# Patient Record
Sex: MALE | Race: BLACK OR AFRICAN AMERICAN | NOT HISPANIC OR LATINO | URBAN - METROPOLITAN AREA
[De-identification: names, ages, dates, MRNs, and addresses within clinical notes are randomized per-mention and may not be internally consistent; named-entity substitution may affect disease eponyms.]

---

## 2017-02-14 ENCOUNTER — COMMUNICATION - RIVER FALLS (OUTPATIENT)
Dept: FAMILY MEDICINE | Facility: CLINIC | Age: 22
End: 2017-02-14

## 2017-02-14 ENCOUNTER — OFFICE VISIT - RIVER FALLS (OUTPATIENT)
Dept: FAMILY MEDICINE | Facility: CLINIC | Age: 22
End: 2017-02-14

## 2017-02-14 ASSESSMENT — MIFFLIN-ST. JEOR: SCORE: 1857.62

## 2017-03-21 ENCOUNTER — OFFICE VISIT - RIVER FALLS (OUTPATIENT)
Dept: FAMILY MEDICINE | Facility: CLINIC | Age: 22
End: 2017-03-21

## 2017-03-21 ASSESSMENT — MIFFLIN-ST. JEOR: SCORE: 1880.3

## 2017-04-21 ENCOUNTER — OFFICE VISIT - RIVER FALLS (OUTPATIENT)
Dept: FAMILY MEDICINE | Facility: CLINIC | Age: 22
End: 2017-04-21

## 2017-04-21 ASSESSMENT — MIFFLIN-ST. JEOR: SCORE: 1893.91

## 2017-05-30 ENCOUNTER — OFFICE VISIT - RIVER FALLS (OUTPATIENT)
Dept: FAMILY MEDICINE | Facility: CLINIC | Age: 22
End: 2017-05-30

## 2017-05-30 ASSESSMENT — MIFFLIN-ST. JEOR: SCORE: 1907.51

## 2017-08-18 ENCOUNTER — OFFICE VISIT - RIVER FALLS (OUTPATIENT)
Dept: FAMILY MEDICINE | Facility: CLINIC | Age: 22
End: 2017-08-18

## 2017-08-18 ASSESSMENT — MIFFLIN-ST. JEOR: SCORE: 1912.05

## 2017-09-15 ENCOUNTER — AMBULATORY - RIVER FALLS (OUTPATIENT)
Dept: FAMILY MEDICINE | Facility: CLINIC | Age: 22
End: 2017-09-15

## 2017-09-20 ENCOUNTER — OFFICE VISIT - RIVER FALLS (OUTPATIENT)
Dept: FAMILY MEDICINE | Facility: CLINIC | Age: 22
End: 2017-09-20

## 2017-09-20 ASSESSMENT — MIFFLIN-ST. JEOR: SCORE: 1925.66

## 2018-09-10 ENCOUNTER — OFFICE VISIT - RIVER FALLS (OUTPATIENT)
Dept: FAMILY MEDICINE | Facility: CLINIC | Age: 23
End: 2018-09-10

## 2018-09-10 ENCOUNTER — COMMUNICATION - RIVER FALLS (OUTPATIENT)
Dept: FAMILY MEDICINE | Facility: CLINIC | Age: 23
End: 2018-09-10

## 2018-09-10 ASSESSMENT — MIFFLIN-ST. JEOR: SCORE: 1901.16

## 2019-11-11 ENCOUNTER — OFFICE VISIT - RIVER FALLS (OUTPATIENT)
Dept: FAMILY MEDICINE | Facility: CLINIC | Age: 24
End: 2019-11-11

## 2020-03-02 ENCOUNTER — COMMUNICATION - RIVER FALLS (OUTPATIENT)
Dept: FAMILY MEDICINE | Facility: CLINIC | Age: 25
End: 2020-03-02

## 2020-03-02 ENCOUNTER — OFFICE VISIT - RIVER FALLS (OUTPATIENT)
Dept: FAMILY MEDICINE | Facility: CLINIC | Age: 25
End: 2020-03-02

## 2020-08-31 ENCOUNTER — COMMUNICATION - RIVER FALLS (OUTPATIENT)
Dept: FAMILY MEDICINE | Facility: CLINIC | Age: 25
End: 2020-08-31

## 2020-09-01 ENCOUNTER — OFFICE VISIT - RIVER FALLS (OUTPATIENT)
Dept: FAMILY MEDICINE | Facility: CLINIC | Age: 25
End: 2020-09-01

## 2020-10-09 ENCOUNTER — OFFICE VISIT - RIVER FALLS (OUTPATIENT)
Dept: FAMILY MEDICINE | Facility: CLINIC | Age: 25
End: 2020-10-09

## 2020-12-14 ENCOUNTER — COMMUNICATION - RIVER FALLS (OUTPATIENT)
Dept: FAMILY MEDICINE | Facility: CLINIC | Age: 25
End: 2020-12-14

## 2020-12-15 ENCOUNTER — COMMUNICATION - RIVER FALLS (OUTPATIENT)
Dept: FAMILY MEDICINE | Facility: CLINIC | Age: 25
End: 2020-12-15

## 2022-02-12 VITALS
BODY MASS INDEX: 27.12 KG/M2 | SYSTOLIC BLOOD PRESSURE: 134 MMHG | HEART RATE: 105 BPM | TEMPERATURE: 98.6 F | OXYGEN SATURATION: 99 % | DIASTOLIC BLOOD PRESSURE: 74 MMHG | WEIGHT: 200 LBS

## 2022-02-12 VITALS
BODY MASS INDEX: 25.6 KG/M2 | TEMPERATURE: 97.3 F | SYSTOLIC BLOOD PRESSURE: 129 MMHG | TEMPERATURE: 98.8 F | HEIGHT: 72 IN | HEIGHT: 72 IN | BODY MASS INDEX: 26.41 KG/M2 | BODY MASS INDEX: 26.01 KG/M2 | WEIGHT: 195 LBS | SYSTOLIC BLOOD PRESSURE: 126 MMHG | HEART RATE: 92 BPM | HEART RATE: 86 BPM | WEIGHT: 189 LBS | HEIGHT: 72 IN | SYSTOLIC BLOOD PRESSURE: 120 MMHG | DIASTOLIC BLOOD PRESSURE: 68 MMHG | HEART RATE: 79 BPM | DIASTOLIC BLOOD PRESSURE: 74 MMHG | DIASTOLIC BLOOD PRESSURE: 80 MMHG | TEMPERATURE: 98.3 F | WEIGHT: 192 LBS

## 2022-02-12 VITALS
HEART RATE: 101 BPM | OXYGEN SATURATION: 98 % | SYSTOLIC BLOOD PRESSURE: 122 MMHG | BODY MASS INDEX: 26.95 KG/M2 | WEIGHT: 199 LBS | HEIGHT: 72 IN | DIASTOLIC BLOOD PRESSURE: 74 MMHG

## 2022-02-12 VITALS
WEIGHT: 190.2 LBS | DIASTOLIC BLOOD PRESSURE: 72 MMHG | HEART RATE: 102 BPM | TEMPERATURE: 98.5 F | SYSTOLIC BLOOD PRESSURE: 130 MMHG | OXYGEN SATURATION: 98 % | BODY MASS INDEX: 25.8 KG/M2

## 2022-02-12 VITALS
HEART RATE: 72 BPM | HEIGHT: 72 IN | TEMPERATURE: 98.1 F | BODY MASS INDEX: 26.22 KG/M2 | DIASTOLIC BLOOD PRESSURE: 80 MMHG | SYSTOLIC BLOOD PRESSURE: 132 MMHG | WEIGHT: 193.6 LBS

## 2022-02-12 VITALS
SYSTOLIC BLOOD PRESSURE: 114 MMHG | DIASTOLIC BLOOD PRESSURE: 78 MMHG | BODY MASS INDEX: 26.55 KG/M2 | WEIGHT: 196 LBS | HEART RATE: 84 BPM | TEMPERATURE: 97.8 F | HEIGHT: 72 IN

## 2022-02-12 VITALS
DIASTOLIC BLOOD PRESSURE: 68 MMHG | HEART RATE: 80 BPM | TEMPERATURE: 98.5 F | BODY MASS INDEX: 24.92 KG/M2 | HEIGHT: 72 IN | WEIGHT: 184 LBS | SYSTOLIC BLOOD PRESSURE: 120 MMHG

## 2022-02-16 NOTE — LETTER
(Inserted Image. Unable to display)   December 11, 2019        REGINALD MENESES  [address]   [place], [place] [id number]        Dear REGINALD,      Thank you for selecting [facility] for your healthcare needs.    Our records indicate you are due for the following services:     Medication Check    To schedule an appointment or if you have further questions, please contact your primary clinic:   [facility]       [phone]   [facility]       [phone]              [facility]     [phone]      Powered by [employer]    Sincerely,    PARISH JeanNP

## 2022-02-16 NOTE — TELEPHONE ENCOUNTER
Entered by Celina Miller CMA on March 02, 2020 9:51:39 AM CST  Has appt with GAIL this evening.      ---------------------  From: REGINALD MENESES  To: [facility]  Sent: 02/28/2020 06:37 p.m. CST  Subject: Prescription renewal request  REGINALD MENESES is requesting renewal of the prescription(s) below and has submitted the following details:  Prescription(s) to be renewed  Medication: Effexor XR 37.5 mg oral capsule, extended release  Dose: See Instructions  Rx date: 11/11/2019  Prescribed by: Neris Youssef  Reason for renewal: Out of medication  Quantity: 5 days worth  Delivery option  Send to my pharmacy  [facility]  [address] [place] [id number]  [place] [address]  Contact Information  By Secure Message  Comments  Is there anyway that I could receive a short-term prescription to this medication until the day of my med-check appointment? Thank you.

## 2022-02-16 NOTE — NURSING NOTE
Comprehensive Intake Entered On:  11/11/2019 4:30 PM CST    Performed On:  11/11/2019 4:24 PM CST by Mary Kay Hayes LPN               Summary   Chief Complaint :   would like to discuss starting back up on antidepressant medication, says he has been off of medication for about 1 year   Weight Measured :   190.2 lb(Converted to: 190 lb 3 oz, 86.27 kg)    Systolic Blood Pressure :   130 mmHg   Diastolic Blood Pressure :   72 mmHg   Mean Arterial Pressure :   91 mmHg   Peripheral Pulse Rate :   102 bpm (HI)    BP Site :   Right arm   BP Method :   Manual   Temperature Tympanic :   98.5 DegF(Converted to: 36.9 DegC)    Oxygen Saturation :   98 %   Mary Kay Hayes LPN - 11/11/2019 4:24 PM CST   Health Status   Allergies Verified? :   Yes   Medication History Verified? :   Yes   Medical History Verified? :   No   Pre-Visit Planning Status :   Completed   Tobacco Use? :   Current every day smoker   Mary Kay Hayes LPN - 11/11/2019 4:24 PM CST   Meds / Allergies   (As Of: 11/11/2019 4:30:49 PM CST)   Allergies (Active)   Amoxil  Estimated Onset Date:   Unspecified ; Reactions:   Rash ; Created By:   Shellie Vital; Reaction Status:   Active ; Category:   Drug ; Substance:   Amoxil ; Type:   Allergy ; Updated By:   Shellie Vital; Reviewed Date:   9/10/2018 11:52 AM CDT        Medication List   (As Of: 11/11/2019 4:30:49 PM CST)   Prescription/Discharge Order    sertraline  :   sertraline ; Status:   Prescribed ; Ordered As Mnemonic:   sertraline 50 mg oral tablet ; Simple Display Line:   50 mg, 1 tab(s), po, daily, 90 tab(s), 0 Refill(s) ; Ordering Provider:   Jackson Nguyen MD; Catalog Code:   sertraline ; Order Dt/Tm:   11/10/2017 1:17:28 PM CST          buPROPion  :   buPROPion ; Status:   Prescribed ; Ordered As Mnemonic:   buPROPion 150 mg/24 hours (XL) oral tablet, extended release ; Simple Display Line:   See Instructions, TAKE ONE TABLET BY MOUTH EVERY 24 HOURS, 5 tab(s), 0 Refill(s) ; Ordering  Provider:   Armando Alicia MD; Catalog Code:   buPROPion ; Order Dt/Tm:   1/4/2018 4:15:12 PM CST

## 2022-02-16 NOTE — NURSING NOTE
Depression Screening Entered On:  9/1/2020 3:21 PM CDT    Performed On:  9/1/2020 3:20 PM CDT by Ana Estrada CMA               Depression Screening   Little Interest - Pleasure in Activities :   Not at all   Feeling Down, Depressed, Hopeless :   Not at all   Initial Depression Screen Score :   0 Score   Poor Appetite or Overeating :   Not at all   Trouble Falling or Staying Asleep :   Not at all   Feeling Tired or Little Energy :   Several days   Feeling Bad About Yourself :   Not at all   Trouble Concentrating :   Not at all   Moving or Speaking Slowly :   Not at all   Thoughts Better Off Dead or Hurting Self :   Not at all   EVELYN Difficulty with Work, Home, Others :   Not difficult at all   Detailed Depression Screen Score :   1    Total Depression Screen Score :   1    Ana Estrada CMA - 9/1/2020 3:20 PM CDT

## 2022-02-16 NOTE — PROGRESS NOTES
"   Patient:   REGINALD MENESES            MRN: [id number]            FIN: [id number]               Age:   23 years     Sex:  Male     :  1995   Associated Diagnoses:   Arm mass; Anxiety; Blurry vision, bilateral; Depression, major, recurrent, moderate; Fatigue; Arm mass; Anxiety; Blurry vision, bilateral; Depression, major, recurrent, moderate; Fatigue   Author:   Neris Youssef      Chief Complaint   9/10/2018 11:36 AM CDT   Patient presents for bump on back of R arm x 2 years and TSH checked today      History of Present Illness   Christopher Meneses is a very pleasant, otherwise healthy 24 y/o male who presents today for several complaints.   He notes a bump on the back of his upper right arm that has been present for the past two years that has been increasingly painful in the past year. He states that this started in two separate spots near his triceps after his girlfriend pinched him.  He has felt increasingly fatigued over the past few years and has had a 15 pound unintentional weight loss in the past 2-3 months. He has episodes with feeling \"sweaty\" or hot flashes. Initially, he thought that these were possibly correlated with his anxiety. He is interested in having his thyroid checked.   The patient has a history of depression/anxiety and states that his symptoms have increased recently. He does not take his sertraline or wellbutrin as prescribed as they make him feel \"blah\". PHQ-9: 14, EVELYN: 19. Managing his anxiety currently with meditation and breathing techniques which he states helps. He is interested in therapy. He has a six-month-old son and doesn't get full nights of sleep. 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   He notes vision blurring and intermittent subtle double vision. He does not wear contacts or glasses and it has been a very long time since he had an eye exam. He does have itching eyes, but attributes this to his seasonal allergies, none recently.  "      Review of Systems   Constitutional:  Sweats, Fatigue, Weight loss, No fever, No chills.    Eye:  Blurring, Double vision.    Ear/Nose/Mouth/Throat:  Negative.    Respiratory:  Negative.    Cardiovascular:  Negative.    Gastrointestinal:  Negative.    Genitourinary:  Negative.    Musculoskeletal:  Negative.    Integumentary:  Negative except as documented in history of present illness.    Neurologic:  Negative.    Psychiatric:  Anxiety, Depression, Not suicidal.       Health Status   Allergies:    Allergic Reactions (Selected)  Severity Not Documented  Amoxil (Rash)   Medications:  (Selected)   Prescriptions  Prescribed  buPROPion 150 mg/24 hours (XL) oral tablet, extended release: See Instructions, Instructions: TAKE ONE TABLET BY MOUTH EVERY 24 HOURS, # 5 tab(s), 0 Refill(s), Type: Maintenance, Pharmacy: [facility]  sertraline 50 mg oral tablet: 1 tab(s) ( 50 mg ), po, daily, # 90 tab(s), 0 Refill(s), Type: Maintenance, Pharmacy: [facility],    Medications          *denotes recorded medication          buPROPion 150 mg/24 hours (XL) oral tablet, extended release: See Instructions, TAKE ONE TABLET BY MOUTH EVERY 24 HOURS, 5 tab(s), 0 Refill(s).          sertraline 50 mg oral tablet: 50 mg, 1 tab(s), po, daily, 90 tab(s), 0 Refill(s).     Problem list:    All Problems  Anxiety / SNOMED CT 79193210 / Confirmed  Depression, major, recurrent, moderate / SNOMED CT 274034878 / Confirmed  Seasonal allergies / SNOMED CT 168117468 / Confirmed      Histories   Procedure history:    Polysomnogram - HST (431043606) on 9/7/2017 at 22 Years.  Comments:  9/20/2017 3:47 PM - Esther Rosario CMA  AHI 1.8/hr   Social History:        Alcohol Assessment            Current, 1-2 times per week, 1 drinks/episode average.  2 drinks/episode maximum.      Tobacco Assessment            Smoke and chew., 5 year(s).                     Comments:                      03/23/2017 - Maximo  Carol                     Amount per day:  Frequently.      Employment and Education Assessment            Employed, Work/School description: housekeeping.        Physical Examination   Vital Signs   9/10/2018 11:36 AM CDT Temperature Tympanic 98.1 DegF    Peripheral Pulse Rate 72 bpm    HR Method Electronic    Systolic Blood Pressure 132 mmHg  HI    Diastolic Blood Pressure 80 mmHg    Mean Arterial Pressure 97 mmHg    BP Site Right arm    BP Method Manual      Measurements from flowsheet : Measurements   9/10/2018 11:36 AM CDT Height Measured - Standard 72 in    Weight Measured - Standard 193.6 lb    BSA 2.11 m2    Body Mass Index 26.25 kg/m2  HI      General:  No acute distress.    Eye:  Pupils are equal, round and reactive to light, Extraocular movements are intact, Normal conjunctiva, OD: 20/40, OS: 20/50, OU: 20/30, .    HENT:  Tympanic membranes are clear, Oral mucosa is moist, No pharyngeal erythema, No sinus tenderness.    Neck:  Supple, Non-tender, No lymphadenopathy, Mild diffuse thyromegaly..    Respiratory:  Lungs are clear to auscultation, Respirations are non-labored.    Cardiovascular:  Normal rate, Regular rhythm, No murmur, Good pulses equal in all extremities.    Gastrointestinal:  Soft, Non-tender, Non-distended, Normal bowel sounds.    Genitourinary:  No scrotal tenderness, No inguinal tenderness, No lesions, No inguinal hernia. .    Musculoskeletal:  Normal range of motion, Normal strength, Normal gait, Two small, mobile, tender masses present to right upper posterior arm..    Integumentary:  Warm, Dry, No rash.    Neurologic:  Alert, Oriented, No focal deficits, Cranial Nerves II-XII are grossly intact, Normal deep tendon reflexes.    Psychiatric:  Appropriate mood & affect.       Impression and Plan   Diagnosis     Arm mass (YVB43-JB R22.31).     Anxiety (JSA83-YU F41.1).     Blurry vision, bilateral (XQS23-UV H53.8).     Depression, major, recurrent, moderate (VOX29-ER F33.1).     Fatigue  (FRK36-SH R53.83).     Plan:  1) Fatigue: CBC w/ differential, TSH, T4, and Lyme ordered.   2) Right upper arm mass: ultrasound ordered, patient is aware that they will call him to set this up.   3) Depression/anxiety: local counselor list given to patient. He is not interested in continuing his medications at this time, which is reasonable.   4) Blurry vision: patient agreeable to schedule appointment with optometry for visual changes   .    Patient Instructions:       Counseled: Patient, Regarding diagnosis.    Orders     Orders (Selected)   Outpatient Orders  Ordered  US Arm Right (Request): Arm mass  Ordered (Dispatched)  CBC (h/h, RBC, indices, WBC, Plt)* ([facility]): Specimen Type: Blood, Collection Date: 09/10/18 12:25:00 CDT  Lyme disease antibody, total, eia with reflex to western blot (igg,igm)* ([facility]): Specimen Type: Serum, Collection Date: 09/10/18 12:25:00 CDT  T4, free* ([facility]): Specimen Type: Serum, Collection Date: 09/10/18 12:25:00 CDT  TSH* ([facility]): Specimen Type: Serum, Collection Date: 09/10/18 12:25:00 CDT.

## 2022-02-16 NOTE — NURSING NOTE
Comprehensive Intake Entered On:  9/1/2020 3:20 PM CDT    Performed On:  9/1/2020 3:18 PM CDT by Ana Estrada CMA   Chief Complaint :   Depression/Anxiety med check; verbal consent given for video visit   Ana Estrada CMA - 9/1/2020 3:18 PM CDT   Health Status   Allergies Verified? :   Yes   Medication History Verified? :   Yes   Medical History Verified? :   Yes   Tobacco Use? :   Former smoker   Ana Estrada CMA - 9/1/2020 3:18 PM CDT   Consents   Consent for Immunization Exchange :   Consent Granted   Consent for Immunizations to Providers :   Consent Granted   Ana Estrada CMA - 9/1/2020 3:18 PM CDT   Meds / Allergies   (As Of: 9/1/2020 3:20:10 PM CDT)   Allergies (Active)   Amoxil  Estimated Onset Date:   Unspecified ; Reactions:   Rash ; Created By:   Shellie Vital; Reaction Status:   Active ; Category:   Drug ; Substance:   Amoxil ; Type:   Allergy ; Updated By:   Shellie Vital; Reviewed Date:   9/1/2020 3:19 PM CDT        Medication List   (As Of: 9/1/2020 3:20:10 PM CDT)   Prescription/Discharge Order    venlafaxine  :   venlafaxine ; Status:   Prescribed ; Ordered As Mnemonic:   Effexor XR 75 mg oral capsule, extended release ; Simple Display Line:   75 mg, 1 cap(s), Oral, daily, 30 cap(s), 0 Refill(s) ; Ordering Provider:   Neris Youssef; Catalog Code:   venlafaxine ; Order Dt/Tm:   8/31/2020 3:37:44 PM CDT          venlafaxine  :   venlafaxine ; Status:   Completed ; Ordered As Mnemonic:   Effexor XR 75 mg oral capsule, extended release ; Simple Display Line:   75 mg, 1 cap(s), Oral, daily, 90 EA, 1 Refill(s) ; Ordering Provider:   Neris Youssef; Catalog Code:   venlafaxine ; Order Dt/Tm:   3/2/2020 6:37:46 PM CST            ID Risk Screen   Recent Travel History :   No recent travel   Family Member Travel History :   No recent travel   Other Exposure to Infectious Disease :   Unknown   Ana Estrada CMA - 9/1/2020 3:18 PM CDT

## 2022-02-16 NOTE — PROGRESS NOTES
Patient:   REGINALD MENESES            MRN: [id number]            FIN: [id number]               Age:   21 years     Sex:  Male     :  1995   Associated Diagnoses:   None   Author:   Jackson Nguyen MD      Procedure   EKG procedure   Date:  2017.     EKG findings   Interpretation: Jackson Nguyen MD.     Interpretation: normal ekg.

## 2022-02-16 NOTE — PROGRESS NOTES
Patient:   REGINALD MENESES            MRN: [id number]            FIN: [id number]               Age:   21 years     Sex:  Male     :  1995   Associated Diagnoses:   None   Author:   Jackson Nguyen MD      Visit Information      Date of Service: 2017 02:33 pm  Performing Location: [facility]  Encounter#: [id number]      Primary Care Provider (PCP):  Eddie Quintanilla MD    NPI# [id number]      Referring Provider:  No referring provider recorded for selected visit.      Chief Complaint   2017 2:45 PM CST    Pt c/o UTI. Used an at home UTI test which was positive.        History of Present Illness   Pt has had several months of mild, intermittent dysuria. Took a home test for UTI which was positive for white cells. His girlfriend has had some similar symptoms. He had a workup for this in the past which was negative for infection.     Pt w anxiety and very anxious about possibly having an STD      Review of Systems         Gen - denies fever/chills   - no discharge      Health Status   Allergies:    Allergic Reactions (Selected)  Severity Not Documented  Amoxil (Rash)   Medications:  (Selected)   Documented Medications  Documented  PROzac: ( 20 mg ), po, daily, 0 Refill(s), Type: Maintenance   Problem list:    All Problems  Depression, major, recurrent, moderate / ICD-9-.32 / Confirmed      Histories   Past Medical History:    No active or resolved past medical history items have been selected or recorded.   Family History:    Asthma  Mother (Michelle)     Procedure history:    No active procedure history items have been selected or recorded.   Social History:        Tobacco Assessment: Denies Tobacco Use      Employment and Education Assessment            Student        Physical Examination   Vital Signs   2017 2:45 PM CST Temperature Tympanic 98.5 DegF    Peripheral Pulse Rate 80 bpm    Systolic Blood Pressure 120 mmHg    Diastolic Blood Pressure 68 mmHg    Mean  Arterial Pressure 85 mmHg      Measurements from flowsheet : Measurements   2/14/2017 2:45 PM CST Height Measured - Standard 72 in    Weight Measured - Standard 184 lb    BSA 2.06 m2    Body Mass Index 24.95 kg/m2      Gen - appears well         Review / Management   Results review:  Lab results   2/14/2017 3:10 PM CST UA Color Yellow    UA Clarity Clear    UA pH 6.5    UA Specific Gravity 1.025    UA Glucose Negative mg/dL    UA Bilirubin Negative    UA Ketones Negative mg/dL    U Occult Blood Negative    UA Protein Negative mg/dL    UA Nitrite Negative    UA Leuk Est Negative    UA Urobilinogen Normal    UA WBC 0-2    UA RBC 0-2    UA Bacteria Few    UA Epithelial Cells Few    UA Mucous Present   .       Impression and Plan   Dysuria  - first catch urine without any evidence of pyuria. Will await GC studies. Meanwhile reassured him . His girlfriend is getting STD testing later this week.

## 2022-02-16 NOTE — NURSING NOTE
Depression Screening Entered On:  3/2/2020 7:00 PM CST    Performed On:  3/2/2020 6:59 PM CST by Mary Kay Hayes LPN               Depression Screening   Little Interest - Pleasure in Activities :   Not at all   Feeling Down, Depressed, Hopeless :   Several days   Initial Depression Screen Score :   1    Trouble Falling or Staying Asleep :   Not at all   Feeling Tired or Little Energy :   Several days   Poor Appetite or Overeating :   Not at all   Feeling Bad About Yourself :   Not at all   Trouble Concentrating :   Several days   Moving or Speaking Slowly :   Not at all   Thoughts Better Off Dead or Hurting Self :   Not at all   Detailed Depression Screen Score :   2    Total Depression Screen Score :   3    EVELYN Difficulty with Work, Home, Others :   Not difficult at all   Mary Kay Hayes LPN - 3/2/2020 6:59 PM CST

## 2022-02-16 NOTE — PROGRESS NOTES
Patient:   REGINALD MENESES            MRN: [id number]            FIN: [id number]               Age:   22 years     Sex:  Male     :  1995   Associated Diagnoses:   Snoring; Gasping for breath; Excessive daytime sleepiness   Author:   Armando Alicia MD      Chief Complaint   2017 5:14 PM CDT    pt here for fu with sleep study      History of Present Illness   22 year old male patient presents in follow up after having a polysomnogram on , this was an at home sleep test.     Reason for test:Excessive snoring, wakes up gasping for air, some allergies, also his father passed away from a heart attack and had complications with sleep apnea. He also has daytime sleepiness, yet does feel he get's enough sleep, think's he sleep's enough hours.     Results / AHI: AHI of 1.8, NYDIA of 2.4, and snore index of 0.7%, lowest saturation is 76%.     CPAP Status: Is not on c-pap machine     Compliance: Due to test result's he does not have sleep apnea at this time.       Review of Systems   Constitutional:  No fever, No chills.    Ear/Nose/Mouth/Throat:  No nasal congestion, No sore throat.    Respiratory:  No shortness of breath, No cough, No wheezing.    Cardiovascular:  No palpitations.    Gastrointestinal:  No nausea, No vomiting.    Integumentary:  No rash.    Neurologic:  No headache.             Health Status   Allergies:    Allergic Reactions (Selected)  Severity Not Documented  Amoxil (Rash)   Medications:  (Selected)   Prescriptions  Prescribed  Wellbutrin  mg/24 hours oral tablet, extended release: 1 tab(s) ( 150 mg ), po, q 24 hrs, # 30 tab(s), 2 Refill(s), Type: Maintenance, Pharmacy: [facility], 1 tab(s) po q 24 hrs  sertraline 50 mg oral tablet: 1 tab(s) ( 50 mg ), po, daily, # 30 tab(s), 3 Refill(s), Type: Maintenance, Pharmacy: [facility], Needs visit., 1 tab(s) po daily,x30 day(s)   Problem list:    All Problems  Seasonal allergies /  SNOMED CT 428957301 / Confirmed  Depression, major, recurrent, moderate / SNOMED CT 407390383 / Confirmed      Histories   Past Medical History:    Active  Depression, major, recurrent, moderate (616300165): Onset on 5/23/2012 at 16 years.  Seasonal allergies (639120612)   Family History:    Asthma  Mother (Michelle)     Procedure history:    Polysomnogram - HST (074416818) on 9/7/2017 at 22 Years.  Comments:  9/20/2017 3:47 PM - Esther Rosario CMA  AHI 1.8/hr   Social History:        Alcohol Assessment            Current, 1-2 times per week, 1 drinks/episode average.  2 drinks/episode maximum.      Tobacco Assessment            Smoke and chew., 5 year(s).                     Comments:                      03/23/2017 - Carol Marsh                     Amount per day:  Frequently.      Employment and Education Assessment            Employed, Work/School description: housekeeping.        Physical Examination   Vital Signs   9/20/2017 5:14 PM CDT Peripheral Pulse Rate 101 bpm  HI    Pulse Site Radial artery    Systolic Blood Pressure 122 mmHg    Diastolic Blood Pressure 74 mmHg    Mean Arterial Pressure 90 mmHg    BP Site Right arm    Oxygen Saturation 98 %      Measurements from flowsheet : Measurements   9/20/2017 5:14 PM CDT Height Measured - Standard 72 in    Weight Measured - Standard 199 lb    BSA 2.14 m2    Body Mass Index 26.99 kg/m2      General:  Alert and oriented, No acute distress.    Eye:  Pupils are equal, round and reactive to light, Normal conjunctiva.    HENT:  Oral mucosa is moist.    Neck:  Supple.    Respiratory:  Respirations are non-labored.    Cardiovascular:  Normal rate, Regular rhythm, No edema.    Gastrointestinal:  Non-distended.    Musculoskeletal:  Normal gait.    Integumentary:  Warm, No rash.    Neurologic:  Alert, Oriented.    Psychiatric:  Cooperative, Appropriate mood & affect, Normal judgment.       Impression and Plan   Diagnosis     Snoring (DSP07-XH R06.83).     Gasping for  breath (NCN52-XK R06.89).     Excessive daytime sleepiness (BOT31-ZT G47.19).     Course:  Progressing as expected, Reviewed patients study and its significance..    Plan:  Sleep study results are normal, no sleep apnea seen.   Can elevate head of the bed.   Your AHI was 1.8 which is how many times you stop breathing within the hour.   Please return if you continue to have problems or an increase in problems.   if he wants to pursue further could order attended PSG   .    Summary:  Discussed sleep study result's-Negative test.     Discussed sleep habit's, elevating the bed, other options to help with the snoring. .

## 2022-02-16 NOTE — NURSING NOTE
PCP:  ÁNGEL    Time of Call:  1109    Person calling  Patient @ [phone]    Reason for Call: Patient calling in regards to sleep study results.    Returned call at : Spoke to patient at 1115- advised that she set up an appt with either DOMINICK or MARCELO to review sleep study result and discuss recommendations moving forward.  Patient states understanding and had no further questions.  He was transferred to scheduling.

## 2022-02-16 NOTE — TELEPHONE ENCOUNTER
---------------------  From: Melina Paige (Appointment Pool ([id number]))   To: REGINALD MENESES    Sent: 3/2/2020 8:45:27 AM CST  Subject: RE: Appointment Request     Juvencio Ledesma    I have you rescheduled for tonight at 620pm with Neris Bloom. Thank you.      ---------------------  From: REGINALD MENESES  To: [facility]  Sent: 2020 02:02 p.m. CST  Subject: Appointment Request  Patient Name: REGINALD MENESES  Patient : 1995  Appointment Dates: First Available Appointment  Preferred Days: Monday  Preferred Time: Afternoon  Contact Patient by: Secure Message    Reason for Visit:    Med-check, experiencing withdrawal from effexor I would like to schedule an appointment sooner than .

## 2022-02-16 NOTE — TELEPHONE ENCOUNTER
---------------------  From: Nerissa Figueroa RN (Phone Messages Pool ([id number]))   To: Captual Message Pool ([address] [place] [address]);     Sent: 12/14/2020 3:16:19 PM CST  Subject: Allergy testing      Time of Call:  1117  Return call at:1500     Person Calling:  wife  Phone number:  [phone]    Note:   Patient wife calls. I returned the call and talked to the patient. He is going into the , leaving 1/5/2021. He wants to have rast testing or Allergy testing done prior to leaving. He wants to know if he is allergic to Amoxicillin and penicillin. His mother told him he was. I let him know that he may not be able to get an allergy consult prior to leaving for  service on 1/5/21. I do not see any allergy profiles for these antigens in the [facility] catalogue. I advised him he can check his Ins network for allergists and contact an allergist from his network for consult scheduling prior to leaving. Yet he may not be able to get in to see some one that fast. If it would help would this be something you would be comfortable to request a referral for?    Last office visit and reason:  10/9/20 tobacco abuse.---------------------  From: Mary Kay Hayes LPN (Captual Message Pool (82449[facility]))   To: Neris Youssef;     Sent: 12/14/2020 3:20:55 PM CST  Subject: FW: Allergy testing---------------------  From: Neris Youssef   To: Captual Message Pool (61176[facility]);     Sent: 12/14/2020 4:38:45 PM CST  Subject: RE: Allergy testing      yes, feel free to put in referral if he needs it in my name. This is also something the  could probably test for [id number]. Placed referral for allergist.Pt returned call - informed him that referral was placed and referral coordinators should be in contact with him later this week. He expressed understanding and had no further questions.Referral sent to [facility], only allergy facility in the area that accepts patients  insurance. Patient can call directly to schedule at [phone].  to update.

## 2022-02-16 NOTE — PROGRESS NOTES
Patient:   REGINALD MENESES            MRN: [id number]            FIN: [id number]               Age:   24 years     Sex:  Male     :  1995   Associated Diagnoses:   Depression, major, recurrent, moderate; EVELYN (generalized anxiety disorder)   Author:   Neirs Youssef      Chief Complaint   2019 4:24 PM CST   would like to discuss starting back up on antidepressant medication, says he has been off of medication for about 1 year        History of Present Illness     PHQ and CAGE scoring reviewed with pt  here to discuss stating back on antidepressant medication, has been off it for about 1 year  he has been on a number of medications over the years, suffered from depression back in HS  in looking at med list with him, most recent meds were sertraline and wellbutrin. Used for about 3 months, just didn't work  He has used citalopram and fluoxetine and effexor as well, but admits 'that was long ago.'  has attempted self harm but hat was also long ago, no plans now  he ivory by getting involved in hobbies--reading, writing  he has a girlfriend that he talks with, will consider counseling  he has used benadryl for panic type feelings in the past, that is helpful    is in doing on-line school for software engineering      Review of Systems   All other systems.     Health Status   Allergies:    Allergic Reactions (Selected)  Severity Not Documented  Amoxil (Rash)   Medications:  (Selected)   Prescriptions  Prescribed  Effexor XR 37.5 mg oral capsule, extended release: See Instructions, Instructions: 1 cap daily for 2 weeks then 2 caps daily, # 45 EA, 1 Refill(s), Type: Maintenance, Pharmacy: [facility], 1 cap daily for 2 weeks then 2 caps daily  Vistaril 25 mg oral capsule: See Instructions, Instructions: 1 -2 tabs daily if needed for panic, PRN: for anxiety, # 30 EA, 0 Refill(s), Type: Maintenance, Pharmacy: [facility], 1 -2 tabs daily if needed for  panic,PRN:for anxiety  buPROPion 150 mg/24 hours (XL) oral tablet, extended release: See Instructions, Instructions: TAKE ONE TABLET BY MOUTH EVERY 24 HOURS, # 5 tab(s), 0 Refill(s), Type: Maintenance, Pharmacy: [facility]  sertraline 50 mg oral tablet: 1 tab(s) ( 50 mg ), po, daily, # 90 tab(s), 0 Refill(s), Type: Maintenance, Pharmacy: [facility]   Problem list:    All Problems  Anxiety / SNOMED CT 95500747 / Confirmed  Depression, major, recurrent, moderate / SNOMED CT 023330577 / Confirmed  Seasonal allergies / SNOMED CT 584610175 / Confirmed      Histories   Past Medical History:    Active  Depression, major, recurrent, moderate (778749121): Onset on 5/23/2012 at 16 years.  Seasonal allergies (841577652)  Anxiety (35586984)   Family History:    Asthma  Mother (Michelle)     Procedure history:    Polysomnogram - HST (SNOMED CT 783624877) on 9/7/2017 at 22 Years.  Comments:  9/20/2017 3:47 PM CDT - Esther Rosario CMA  AHI 1.8/hr   Social History:        Alcohol Assessment            Past, 1-2 times per week, 1 drinks/episode average.  2 drinks/episode maximum.      Tobacco Assessment            Smoke and chew., 5 year(s).                     Comments:                      03/23/2017 - Carol Marsh                     Amount per day:  Frequently.      Employment and Education Assessment            Employed, Work/School description: housekeeping.        Physical Examination   Vital Signs   11/11/2019 4:24 PM CST Temperature Tympanic 98.5 DegF    Peripheral Pulse Rate 102 bpm  HI    Systolic Blood Pressure 130 mmHg    Diastolic Blood Pressure 72 mmHg    Mean Arterial Pressure 91 mmHg    BP Site Right arm    BP Method Manual    Oxygen Saturation 98 %      Measurements from flowsheet : Measurements   11/11/2019 4:24 PM CST   Weight Measured - Standard                190.2 lb     General:  Alert and oriented, No acute distress, poor eye contact but  engaging with  conversation.    Respiratory:  Lungs are clear to auscultation, Respirations are non-labored.    Cardiovascular:  Normal rate, Regular rhythm, No murmur.    Psychiatric:  Cooperative, Appropriate mood & affect, Normal judgment, Non-suicidal.       Impression and Plan   Diagnosis     Depression, major, recurrent, moderate (OJR61-GU F33.1).     EVELYN (generalized anxiety disorder) (VSD70-PE F41.1).     Patient Instructions:  Lifestyle risk factors.         Limitations: Alcohol consumption.         Counseled: Patient, Regarding diagnosis, Regarding treatment, Regarding medications, Diet, Activity, Verbalized understanding, counseled fro 15 min regarding the use/risk/benefits of antidepression/anxiety medication including the black box warning, counseled regarding the importance of psychotherapeutic  counseling (has/given resources), counseled regarding signs of worsening that would warrant immediate return to clinic, or ER or call to campus security/suicide hot line.  Counseled regarding healthy sleep pattern and importance of diet and exercise.  Pt expresses understanding  he will start SNRI at low dose and increase gradually and see me in 4 weeks, vistaril for panic as well  encouraged counseling.    Orders     Orders (Selected)   Prescriptions  Prescribed  Effexor XR 37.5 mg oral capsule, extended release: See Instructions, Instructions: 1 cap daily for 2 weeks then 2 caps daily, # 45 EA, 1 Refill(s), Type: Maintenance, Pharmacy: [facility], 1 cap daily for 2 weeks then 2 caps daily  Vistaril 25 mg oral capsule: See Instructions, Instructions: 1 -2 tabs daily if needed for panic, PRN: for anxiety, # 30 EA, 0 Refill(s), Type: Maintenance, Pharmacy: [facility], 1 -2 tabs daily if needed for panic,PRN:for anxiety.

## 2022-02-16 NOTE — NURSING NOTE
Comprehensive Intake Entered On:  10/9/2020 4:05 PM CDT    Performed On:  10/9/2020 3:58 PM CDT by Feng Avalos CMA               Summary   Chief Complaint :   Verbal consent given for a video visit.  Pt wants to know if he can stop taking his venlafaxine or does he have to wean off of it.  Pt doesnt think he needs it any more.  Pt also asking for medication for nicotine cessation for chewing tobacco and vaping.   Feng Avalos CMA - 10/9/2020 3:58 PM CDT   Health Status   Allergies Verified? :   Yes   Medication History Verified? :   Yes   Medical History Verified? :   Yes   Pre-Visit Planning Status :   Not completed   Tobacco Use? :   Never smoker   Feng Avalos CMA - 10/9/2020 3:58 PM CDT   Meds / Allergies   (As Of: 10/9/2020 4:05:16 PM CDT)   Allergies (Active)   Amoxil  Estimated Onset Date:   Unspecified ; Reactions:   Rash ; Created By:   Shellie Vital; Reaction Status:   Active ; Category:   Drug ; Substance:   Amoxil ; Type:   Allergy ; Updated By:   Shellie Vital; Reviewed Date:   9/1/2020 3:19 PM CDT        Medication List   (As Of: 10/9/2020 4:05:16 PM CDT)   Prescription/Discharge Order    venlafaxine  :   venlafaxine ; Status:   Prescribed ; Ordered As Mnemonic:   Effexor XR 75 mg oral capsule, extended release ; Simple Display Line:   75 mg, 1 cap(s), Oral, daily, 90 cap(s), 3 Refill(s) ; Ordering Provider:   Rob Almodovar PA-C; Catalog Code:   venlafaxine ; Order Dt/Tm:   9/1/2020 3:26:51 PM CDT            ID Risk Screen   Recent Travel History :   No recent travel   Family Member Travel History :   No recent travel   Other Exposure to Infectious Disease :   Unknown   Feng Avalos CMA - 10/9/2020 3:58 PM CDT   Social History   Social History   (As Of: 10/9/2020 4:05:16 PM CDT)   Alcohol:        Past, 1-2 times per week, 1 drinks/episode average.  2 drinks/episode maximum.   (Last Updated: 9/24/2018 12:22:55 PM CDT by Paola Solitario)          Tobacco:        Snuff   Comments:  10/9/2020 4:01  PM - Feng Avalos CMA: Pt goes through a tin every 1-2 days.   (Last Updated: 10/9/2020 4:01:19 PM CDT by Feng Avalos CMA)          Electronic Cigarette/Vaping:        Electronic Cigarette Use: 51+ inhales/day.  Type: Nicotine infused.   (Last Updated: 10/9/2020 4:01:52 PM CDT by Feng Avalos CMA)          Employment/School:        Employed, Work/School description: housekeeping.   (Last Updated: 3/23/2017 2:23:00 PM CDT by Carol Marsh)

## 2022-02-16 NOTE — NURSING NOTE
Generalized Anxiety Disorder Screening Entered On:  3/2/2020 6:59 PM CST    Performed On:  3/2/2020 6:58 PM CST by Mary Kay Hayes LPN               Generalized Anxiety Disorder Screening   EVELYN Nervous, Anxious On Edge :   Several days   EVELYN Control Worrying B :   Not at all   EVELYN Worrying Too Much :   Not at all   EVELYN Trouble Relaxing :   Several days   EVELYN Restless :   Not at all   EVELYN Easily Annoyed/Irritable :   Several days   EVELYN Afraid :   Not at all   EVELYN Total Screening Score :   3    EVELYN Difficulty with Work, Home, Others :   Not difficult at all   Mary Kay Hayes LPN - 3/2/2020 6:58 PM CST

## 2022-02-16 NOTE — TELEPHONE ENCOUNTER
Entered by Celina Miller CMA on March 02, 2020 9:32:48 AM CST  ---------------------  From: Celina Miller CMA   To: [facility]    Sent: 3/2/2020 9:32:47 AM CST  Subject: Medication Management     ** Not Approved: will address refill at [facility] this evening with Neris **  venlafaxine (VENLAFAXINE HCL ER 37.5MG CP24)  TAKE 1 CAPSULE BY MOUTH DAILY FOR 2 WEEKS; THEN TAKE TWO CAPSULES BY MOUTH EVERY DAY  Qty:  45 unknown unit        Days Supply:  29        Refills:  1          Substitutions Allowed     Route To Pharmacy - [facility]   Signed by Celina Miller CMA            ------------------------------------------  From: [facility]  To: Neris Youssef  Sent: February 28, 2020 3:30:42 PM CST  Subject: Medication Management  Due: February 29, 2020 3:30:42 PM CST    ** On Hold Pending Signature **  Drug: venlafaxine (Effexor XR 37.5 mg oral capsule, extended release)  TAKE 1 CAPSULE BY MOUTH DAILY FOR 2 WEEKS; THEN TAKE TWO CAPSULES BY MOUTH EVERY DAY  Quantity: 45 unknown unit  Days Supply: 29  Refills: 1  Substitutions Allowed  Notes from Pharmacy:     Dispensed Drug: venlafaxine (venlafaxine 37.5 mg oral capsule, extended release)  TAKE 1 CAPSULE BY MOUTH DAILY FOR 2 WEEKS; THEN TAKE TWO CAPSULES BY MOUTH EVERY DAY  Quantity: 45 unknown unit  Days Supply: 29  Refills: 1  Substitutions Allowed  Notes from Pharmacy:   ------------------------------------------

## 2022-02-16 NOTE — TELEPHONE ENCOUNTER
---------------------  From: Xiomara Ayala CMA (Phone Messages Pool (32224_[facility]))   To: GA[facility] Message Pool (32224_[facility]);     Sent: 8/31/2020 3:37:26 PM CDT  Subject: Phone Message, venlaflaxine.     FYI    Phone Message    PCP:   [facility]IL      Time of Call:  1513       Person Calling:  Pt  Phone number:  [phone], LDM    Returned call at: 1530    Note:  Calls for refill of Venlafaxine 75mg. Medicine has been working well for him.    Return call informed via voicemail he's due for a medication f/u with GA[facility]. Advised protocol refill sent today, but he must schedule prior to running out of medication. Asked for return call to schedule.    Last office visit and reason:  03/02/2020 depression [facility].Noted

## 2022-02-16 NOTE — TELEPHONE ENCOUNTER
Entered by Noa Craig on September 01, 2020 11:16:27 AM CDT  Med Refill      duplicate request.  filled yesterday      ------------------------------------------  From: [facility]  To: Neris Youssef  Sent: August 31, 2020 10:29:43 PM CDT  Subject: Medication Management  Due: September 1, 2020 8:07:28 PM CDT     ** On Hold Pending Signature **     Drug: venlafaxine (Effexor XR 75 mg oral capsule, extended release), TAKE ONE CAPSULE BY MOUTH ONCE EVERY DAY  Quantity: 30 unknown unit  Days Supply: 30  Refills: 0  Substitutions Allowed  Notes from Pharmacy:     Dispensed Drug: venlafaxine (venlafaxine 75 mg oral capsule, extended release), TAKE ONE CAPSULE BY MOUTH ONCE EVERY DAY  Quantity: 30 unknown unit  Days Supply: 30  Refills: 0  Substitutions Allowed  Notes from Pharmacy:  ------------------------------------------

## 2022-02-16 NOTE — TELEPHONE ENCOUNTER
---------------------  From: Sharon Ac LPN (Phone Messages Pool ([id number]_[facility]))   To: Phone Messages Pool ([facility]);     Sent: 2/28/2020 2:32:29 PM CST  Subject: venlafaxine     Phone Message    PCP:   [facility]      Time of Call:  1:42pm       Person Calling:  pt  Phone number:  [phone]  Returned call at: 2:30pm    Note:   Pt LM requesting a refill of venlafaxine- he says Rx is working great.    Returned call and LM for call back- pt needs appt was due in December. If pt taking as prescribed would have been out before now.    Last office visit and reason:  11/11/19 depression/anxiety---------------------  From: Sharon Ac LPN (Phone Messages Pool (46824_[facility]))   To: Appointment Pool (20124[facility] [place]);     Sent: 2/28/2020 3:48:58 PM CST  Subject: FW: venlafaxine     Pt LM at 2:58pm. Returned call and informed pt of need for appt. Tried to transfer pt to schedule  multiple times- lines busy  Please call pt to schedule an appt for med check.appt was already in system---------------------  From: Melina Browne CMA (Phone Messages Pool ([facility]   To: [facility] Message Pool [facility]     Sent: 2/28/2020 4:10:20 PM CST  Subject: FW: venlafaxine     Patient called asking for a 5 day supply until he can see [facility] on Wednesday?Appt. scheduled for tonight (3/2/20) @ 1820

## 2022-02-16 NOTE — TELEPHONE ENCOUNTER
---------------------  From: Ana Amor LPN (Phone Messages Pool (32224_[facility]))   To: Maile Saini;     Sent: 12/15/2020 12:53:45 PM CST  Subject: FW: Unable to return phone call.           ---------------------  From: REGINALD MENESES  To: [facility]  Sent: 12/15/2020 12:46 p.m. CST  Subject: Unable to return phone call.  Hello,  I received a phone call this morning I m assuming it was in relation to the referral I was requesting. I will not be able to return the phone call today, is there anyway the message can be sent to me through this portal?  Thanks!

## 2022-02-16 NOTE — NURSING NOTE
Depression Screening Entered On:  10/9/2020 4:05 PM CDT    Performed On:  10/9/2020 4:05 PM CDT by Feng Avalos CMA               Depression Screening   Little Interest - Pleasure in Activities :   Not at all   Feeling Down, Depressed, Hopeless :   Not at all   Initial Depression Screen Score :   0 Score   Poor Appetite or Overeating :   Not at all   Trouble Falling or Staying Asleep :   Several days   Feeling Tired or Little Energy :   Several days   Feeling Bad About Yourself :   Not at all   Trouble Concentrating :   Not at all   Moving or Speaking Slowly :   Not at all   EVELYN Difficulty with Work, Home, Others :   Not difficult at all   Feng Avalos CMA - 10/9/2020 4:05 PM CDT

## 2022-02-16 NOTE — PROGRESS NOTES
Patient:   REGINALD MENESES            MRN: [id number]            FIN: [id number]               Age:   25 years     Sex:  Male     :  1995   Associated Diagnoses:   Tobacco abuse   Author:   Eduin Dutton PA-C      Visit Information      Date of Service: 10/09/2020 03:08 pm  Performing Location: [facility]  Encounter#: [id number]      Primary Care Provider (PCP):  Neris Youssef    NPI# [id number]   Visit type:  Video Visit via D[employer]ity .    Participants in room during visit:  _1   Location of patient:  _home  Location of provider:  _ (Clinic office   Video Start Time:  _1613  Video End Time:   _1623    Today's visit was conducted via video conference due to the COVID-19 pandemic.  The patient's consent to proceed with a video visit has been obtained and documented.      Chief Complaint   10/9/2020 3:58 PM CDT    Verbal consent given for a video visit.  Pt wants to know if he can stop taking his venlafaxine or does he have to wean off of it.  Pt doesnt think he needs it any more.  Pt also asking for medication for nicotine cessation for chewing tobacco and vaping.        History of Present Illness   Patient is a _25 year old _male who is being evaluated via a billable video visit.    Chief complaint and symptoms noted above and confirmed with patient   he would like to stop the venlafaxine (capsules), he does not feel like he needs it at this time  advised to take it every other for a week and then should  be able to quit  also would like medication for nicotine addiction, he chews (1 tin every 2 days) and vapes (about 50 inhalation daily)  he has tried the nicotine and gum and has not found that effective      Review of Systems   Constitutional:  Negative.    Ear/Nose/Mouth/Throat:  Negative.    Respiratory:  Negative.    Cardiovascular:  Negative.    Gastrointestinal:  Negative.       Health Status   Allergies:    Allergic Reactions (Selected)  Severity Not Documented  Amoxil (Rash)    Medications:  (Selected)   Prescriptions  Prescribed  Effexor XR 75 mg oral capsule, extended release: = 1 cap(s) ( 75 mg ), Oral, daily, # 90 cap(s), 3 Refill(s), Type: Maintenance, Pharmacy: [facility], Due for medication followup with primary. Pt is aware, no further refill prior to visit., 1 cap(s) Oral daily, 72, in, 09/10...   Problem list:    All Problems  Depression, major, recurrent, moderate / SNOMED CT 371610927 / Confirmed  Seasonal allergies / SNOMED CT 800806888 / Confirmed  Anxiety / SNOMED CT 89413067 / Confirmed      Histories   Past Medical History:    Active  Depression, major, recurrent, moderate (890136654): Onset on 5/23/2012 at 16 years.  Seasonal allergies (168656439)  Anxiety (69287494)   Family History:    Asthma  Mother (Michelle)     Procedure history:    Polysomnogram - HST (459304611) on 9/7/2017 at 22 Years.  Comments:  9/20/2017 3:47 PM CDT - Esther Rosario CMA  AHI 1.8/hr   Social History:        Electronic Cigarette/Vaping Assessment            Electronic Cigarette Use: 51+ inhales/day.  Type: Nicotine infused.      Alcohol Assessment            Past, 1-2 times per week, 1 drinks/episode average.  2 drinks/episode maximum.      Tobacco Assessment            Snuff                     Comments:                      10/09/2020 - Feng Avalos CMA                     Pt goes through a tin every 1-2 days.      Employment and Education Assessment            Employed, Work/School description: housekeeping.        Physical Examination   General:  No acute distress.    Respiratory:  Respirations are non-labored.    Psychiatric:  Cooperative, Appropriate mood & affect, Normal judgment.       Impression and Plan   Diagnosis     Tobacco abuse (RRU13-CA Z72.0).     Summary:  will have stop the Effexor (taper down over a week)  for the tobacco abuse will start Chantix (side effects and expectations are discussed).    Orders     Orders   Pharmacy:  Effexor XR 75 mg oral  capsule, extended release (Complete).     Orders   Pharmacy:  Chantix Continuing Month 1 mg oral tablet (Prescribe): = 1 tab(s) ( 1 mg ), Oral, bid, # 60 tab(s), 0 Refill(s), Type: Maintenance, Pharmacy: [facility], 1 tab(s) Oral bid, 200, lb, 3/2/2020 6:31 PM CST, Weight Measured  Chantix Starter Pack 0.5 mg-1 mg oral tablet (Prescribe): See Instructions, Instructions: 0.5 mg tab qd for 3 days, then 0.5 mg bid for 4 days, then 1 mg bid, # 1 EA, 0 Refill(s), Type: Maintenance, Pharmacy: [facility], 0.5 mg tab qd for 3 days, then 0.5 mg bid for 4 days, then 1 mg bid, 200, lb, 3/2/2020 6:31 PM CST, Weight Measured.     Orders   Charges (Evaluation and Management):  20562 office outpatient visit 15 minutes (Charge) (Order): Quantity: 1, Tobacco abuse.

## 2022-02-16 NOTE — PROGRESS NOTES
Patient:   REGINALD MENESES            MRN: [id number]            FIN: [id number]               Age:   25 years     Sex:  Male     :  1995   Associated Diagnoses:   Depression, major, recurrent, moderate; Anxiety   Author:   Rob Almodovar PA-C      Report Summary   Diagnosis  Depression, major, recurrent, moderate (CBR27-CE F33.1).  CoursePatient InstructionsSummaryOrders   Visit Information      Date of Service: 2020 12:32 pm  Performing Location: [facility]  Encounter#: [id number]      Primary Care Provider (PCP):  Neris Youssef    NPI# [id number]      Referring Provider:  Rob Almodovar PA-C    NPI# [id number]   Visit type:  Video Visit via [employer] or [employer].    Participants in room during visit:  2   Location of patient:  Home  Location of provider:  _ (Clinic office )  Video Start Time:    Video End Time:       Today's visit was conducted via video conference due to the COVID-19 pandemic.  The patient's consent to proceed with a video visit has been obtained and documented.      Chief Complaint   Discuss medications.      History of Present Illness   Patient is a 25 year old M who is being evaluated via a billable video visit. Discuss mood disorder. On Effexor. Not suicidal. No side effects. Doing very well at present.      Review of Systems   Constitutional:  Negative.    Eye:  Negative.    Ear/Nose/Mouth/Throat:  Negative.    Respiratory:  Negative.    Cardiovascular:  Negative.    Gastrointestinal:  Negative.    Genitourinary:  Negative.    Immunologic:  Negative.    Musculoskeletal:  Negative.    Integumentary:  Negative.    Neurologic:  Negative.    Psychiatric:  Negative except as documented in history of present illness.       Health Status   Allergies:    Allergic Reactions (Selected)  Severity Not Documented  Amoxil (Rash)   Medications:  (Selected)   Prescriptions  Prescribed  Effexor XR 75 mg oral capsule, extended release: = 1 cap(s) ( 75 mg ),  Oral, daily, # 30 cap(s), 0 Refill(s), Type: Maintenance, Pharmacy: [facility] - [place], Due for medication followup with primary. Pt is aware, no further refill prior to visit., 1 cap(s) Oral daily, 72, in, 09/10...   Problem list:    All Problems  Seasonal allergies / SNOMED CT 285290997 / Confirmed  Depression, major, recurrent, moderate / SNOMED CT 272615328 / Confirmed  Anxiety / SNOMED CT 83578614 / Confirmed      Histories   Past Medical History:    Active  Depression, major, recurrent, moderate (500826450): Onset on 5/23/2012 at 16 years.  Seasonal allergies (682576933)  Anxiety (18994590)   Family History:    Asthma  Mother (Michelle)     Procedure history:    Polysomnogram - HST (698293167) on 9/7/2017 at 22 Years.  Comments:  9/20/2017 3:47 PM CDT - Esther Rosario CMA  AHI 1.8/hr   Social History:        Alcohol Assessment            Past, 1-2 times per week, 1 drinks/episode average.  2 drinks/episode maximum.      Tobacco Assessment            Smoke and chew., 5 year(s).                     Comments:                      03/23/2017 - Carol Marsh                     Amount per day:  Frequently.      Employment and Education Assessment            Employed, Work/School description: housekeeping.        Physical Examination   General:  Alert and oriented, No acute distress.    Eye:  Pupils are equal, round and reactive to light, Normal conjunctiva.    HENT:  Oral mucosa is moist.    Neck:  Supple.    Respiratory:  Respirations are non-labored.    Psychiatric:  Cooperative, Appropriate mood & affect, Normal judgment.       Impression and Plan   Diagnosis     Depression, major, recurrent, moderate (DNY99-BL F33.1).     Anxiety (TNW38-JS F41.1).     Course:  Improving, Progressing as expected.    Patient Instructions:       Counseled: Patient, Regarding diagnosis, Regarding treatment, Regarding medications, Activity, Verbalized understanding.    Summary:  RTC in one year and PRN..    Orders      Orders (Selected)   Prescriptions  Prescribed  Effexor XR 75 mg oral capsule, extended release: = 1 cap(s) ( 75 mg ), Oral, daily, # 90 cap(s), 3 Refill(s), Type: Maintenance, Pharmacy: [facility], Due for medication followup with primary. Pt is aware, no further refill prior to visit., 1 cap(s) Oral daily, 72, in, 09/10....        Health Maintenance      Recommendations     Pending (in the next year)        OverDue           Body Mass Index Check (Male) due  09/10/19  and every 1  year(s)           Alcohol Misuse Screen (Male) due  09/10/19  and every 1  year(s)        Due            Influenza Vaccine due  08/31/20  and every 1  year(s)           HIV Screen (if sexually active) (Male) due  09/01/20  and every 1  year(s)           STD Counseling (if sexually active) (Male) due  09/01/20  and every 1  year(s)           Syphilis Screen (if sexually active) (Male) due  09/01/20  and every 1  year(s)        Due In Future            High Blood Pressure Screen (Male) not due until  03/02/21  and every 1  year(s)     Satisfied (in the past 1 year)        Satisfied            Depression Screen (Male) on  09/01/20.           Depression Screen (Male) on  09/01/20.           Depression Screen (Male) on  09/01/20.           Depression Screen (Male) on  03/02/20.           Depression Screen (Male) on  03/02/20.           Depression Screen (Male) on  03/02/20.           Depression Screen (Male) on  11/12/19.           Depression Screen (Male) on  11/12/19.           Depression Screen (Male) on  11/12/19.           High Blood Pressure Screen (Male) on  03/02/20.           High Blood Pressure Screen (Male) on  11/11/19.           Tobacco Use Screen (Male) on  09/01/20.           Tobacco Use Screen (Male) on  03/02/20.           Tobacco Use Screen (Male) on  11/11/19.

## 2022-02-16 NOTE — NURSING NOTE
Depression Screening Entered On:  11/12/2019 9:28 AM CST    Performed On:  11/12/2019 9:27 AM CST by Mary Kay Hayes LPN               Depression Screening   Little Interest - Pleasure in Activities :   Nearly every day   Feeling Down, Depressed, Hopeless :   Nearly every day   Initial Depression Screen Score :   6    Trouble Falling or Staying Asleep :   Nearly every day   Feeling Tired or Little Energy :   Nearly every day   Poor Appetite or Overeating :   More than half the days   Feeling Bad About Yourself :   More than half the days   Trouble Concentrating :   Nearly every day   Moving or Speaking Slowly :   Several days   Thoughts Better Off Dead or Hurting Self :   Not at all   Detailed Depression Screen Score :   14    Total Depression Screen Score :   20    EVELYN Difficulty with Work, Home, Others :   Very difficult   Depression Screening Comment :   denies alcohol use   Mary Kay Hayes LPN - 11/12/2019 9:27 AM CST

## 2022-02-16 NOTE — PROGRESS NOTES
Chief Complaint    here for a medication check/refills - has been without for a few days  History of Present Illness       11/11/2019 PHQ and CAGE scoring reviewed with pt        here to discuss stating back on antidepressant medication, has been off it for about 1 year        he has been on a number of medications over the years, suffered from depression back in HS        in looking at med list with him, most recent meds were sertraline and wellbutrin. Used for about 3 months, just didn't work        He has used citalopram and fluoxetine and effexor as well, but admits 'that was long ago.'        has attempted self harm but hat was also long ago, no plans now        he ivory by getting involved in hobbies--reading, writing        he has a girlfriend that he talks with, will consider counseling        he has used benadryl for panic type feelings in the past, that is helpful                 is in doing on-line school for [employer]       3/2/2020  her in f/u, he ran out of Effexor 4 days ago. States there was a delay in starting it in November but now that he is on it he is very pleased with how her feels, Has more energy to do what he enjoys. No side effect, no thoughts of self harm      EVELYN 7 score3      PHQ 9 score3      He feels a bit poorly today because he has been out, would like to restart at the 75mg XR dose   Physical Exam   Vitals & Measurements    T: 98.6   F (Tympanic)  HR: 105(Peripheral)  BP: 134/74  SpO2: 99%     WT: 200.0 lb       AOx3, NAD, good eye contact      heart RRR, lungs clear  Assessment/Plan       Anxiety (F41.1)                Depression, major, recurrent, moderate (F33.1)         Ordered:          Return to Clinic (Request), RFV: f/u with [facility] for depression, Return in 6 months                Orders:         venlafaxine, = 1 cap(s) ( 75 mg ), Oral, daily, # 90 EA, 1 Refill(s), Type: Maintenance, Pharmacy: [facility] - [place], 1 cap(s) Oral daily,  (Ordered)  Patient Information     Name:REGINALD MENESES      Address:      [address] [place] [id number]     Sex:Male     YOB: 1995     [phone]     Emergency Contact:SAIMA DIAZ     MRN:[id number]     FIN:[id number]     Location:[facility]     Date of Service:03/02/2020      Primary Care Physician:       Neris Youssef, [phone]      Attending Physician:       Neris Youssef, [phone]  Problem List/Past Medical History    Ongoing     Anxiety     Depression, major, recurrent, moderate     Seasonal allergies    Historical     No qualifying data  Procedure/Surgical History     Polysomnogram - HST (09/07/2017)      Comments: AHI 1.8/hr.  Medications    Effexor XR 37.5 mg oral capsule, extended release, See Instructions, 1 refills    Effexor XR 75 mg oral capsule, extended release, 75 mg= 1 cap(s), Oral, daily, 1 refills    Vistaril 25 mg oral capsule, See Instructions, PRN  Allergies    Amoxil (Rash)  Social History    Smoking Status - 03/02/2020     Current every day smoker     Alcohol      Past, 1-2 times per week, 1 drinks/episode average. 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  Immunizations      Vaccine Date Status          tetanus/diphth/pertuss (Tdap) adult/adol 02/22/2015 Recorded          varicella 07/07/2008 Recorded          meningococcal conjugate vaccine 07/07/2008 Recorded          tetanus/diphth/pertuss (Tdap) adult/adol 07/07/2008 Recorded          varicella 12/12/1997 Recorded          DTaP 12/12/1997 Recorded          Hib (PRP-T) 12/12/1997 Recorded          MMR (measles/mumps/rubella) 12/12/1997 Recorded          DTaP 01/14/1997 Recorded          Hep B 01/14/1997 Recorded          Hib (PRP-T) 01/14/1997 Recorded          IPV  01/14/1997 Recorded          DTaP 1996 Recorded          Hib (PRP-T) 1996 Recorded          IPV 1996 Recorded          DTaP 1996 Recorded          Hep B 1996 Recorded          Hib (PRP-T) 1996 Recorded          IPV 1996 Recorded          Hep B 1996 Recorded

## 2022-02-16 NOTE — PROGRESS NOTES
Patient:   REGINALD MENESES            MRN: [id number]            FIN: [id number]               Age:   21 years     Sex:  Male     :  1995   Associated Diagnoses:   None   Author:   Jackosn Nguyen MD      Visit Information      Date of Service: 2017 03:10 pm  Performing Location: [facility]  Encounter#: [id number]      Primary Care Provider (PCP):  Jackson Nguyen MD    NPI# [id number]      Referring Provider:  No referring provider recorded for selected visit.      Chief Complaint   2017 3:21 PM CDT    Pt here for anxiety med refill.        History of Present Illness   CC as above and reviewed w  patient   Pt reports improvement with sertraline but still notices a lot of problems with ofucs. This is new onseet int  last year. Having toubrle ins chool ebcause of it. Does get reagular exercise and that helps. Does seem to be worsening. Complains of lowe energy and sleepiness       Review of Systems   Gastrointestinal:  No nausea, No vomiting, No abdominal pain.    Neurologic:  No confusion.    Psychiatric:  Anxiety.             Health Status   Allergies:    Allergic Reactions (Selected)  Severity Not Documented  Amoxil (Rash)   Medications:  (Selected)   Prescriptions  Prescribed  Wellbutrin  mg/24 hours oral tablet, extended release: 1 tab(s) ( 150 mg ), po, q 24 hrs, # 30 tab(s), 0 Refill(s), Type: Maintenance, Pharmacy: [facility], 1 tab(s) po q 24 hrs  sertraline 50 mg oral tablet: 1 tab(s) ( 50 mg ), po, daily, # 14 tab(s), 0 Refill(s), Type: Maintenance, Pharmacy: [facility], Needs visit.   Problem list:    All Problems  Depression, major, recurrent, moderate / SNOMED CT 393846519 / Confirmed  Seasonal allergies / SNOMED CT 110263005 / Confirmed      Histories   Past Medical History:    Active  Depression, major, recurrent, moderate (086552743): Onset on 2012 at 16 years.  Seasonal allergies (454263234)    Family History:    Asthma  Mother (Michelle)     Procedure history:    No active procedure history items have been selected or recorded.   Social History:        Alcohol Assessment            Never      Tobacco Assessment: Current            Smoke and chew., 5 year(s).                     Comments:                      03/23/2017 - Carol Marsh                     Amount per day:  Frequently.      Substance Abuse Assessment: Denies Substance Abuse      Employment and Education Assessment            Employed, Work/School description: housekeeping.      Exercise and Physical Activity Assessment: Regular exercise        Physical Examination   Vital Signs   5/30/2017 3:21 PM CDT Temperature Tympanic 98.8 DegF    Peripheral Pulse Rate 86 bpm    Systolic Blood Pressure 120 mmHg    Diastolic Blood Pressure 74 mmHg    Mean Arterial Pressure 89 mmHg      Measurements from flowsheet : Measurements   5/30/2017 3:21 PM CDT Height Measured - Standard 72 in    Weight Measured - Standard 195 lb    BSA 2.12 m2    Body Mass Index 26.44 kg/m2      Appears well  Psych - pleasant, doesn't make eye contact      Impression and Plan   Anxiety, some attentino symtpoms  doubt adhd due to recent onset  referred to psychology consider neuropsych testing  stop sertraline try wellbutrin see if this helps

## 2022-02-16 NOTE — NURSING NOTE
Generalized Anxiety Disorder Screening Entered On:  11/12/2019 9:28 AM CST    Performed On:  11/12/2019 9:28 AM CST by Mary Kay Hayes LPN               Generalized Anxiety Disorder Screening   EVELYN Nervous, Anxious On Edge :   More than half the days   EVELYN Control Worrying B :   More than half the days   EVELYN Worrying Too Much :   Nearly every day   EVELYN Restless :   More than half the days   EVELYN Easily Annoyed/Irritable :   More than half the days   EVELYN Afraid :   More than half the days   EVELYN Trouble Relaxing :   Nearly every day   EVELYN Total Screening Score :   16    EVELYN Difficulty with Work, Home, Others :   Very difficult   Mary Kay Hayes LPN - 11/12/2019 9:28 AM CST

## 2022-02-16 NOTE — NURSING NOTE
Comprehensive Intake Entered On:  3/2/2020 6:34 PM CST    Performed On:  3/2/2020 6:31 PM CST by Mary Kay Hyaes LPN               Summary   Chief Complaint :   here for a medication check/refills - has been without for a few days   Weight Measured :   200.0 lb(Converted to: 200 lb 0 oz, 90.72 kg)    Systolic Blood Pressure :   134 mmHg (HI)    Diastolic Blood Pressure :   74 mmHg   Mean Arterial Pressure :   94 mmHg   Peripheral Pulse Rate :   105 bpm (HI)    BP Site :   Right arm   BP Method :   Manual   Temperature Tympanic :   98.6 DegF(Converted to: 37.0 DegC)    Oxygen Saturation :   99 %   Mary Kay Hayes LPN - 3/2/2020 6:31 PM CST   Health Status   Allergies Verified? :   Yes   Medication History Verified? :   Yes   Medical History Verified? :   No   Pre-Visit Planning Status :   Completed   Tobacco Use? :   Current every day smoker   Mary Kay Hayes LPN - 3/2/2020 6:31 PM CST   Meds / Allergies   (As Of: 3/2/2020 6:34:05 PM CST)   Allergies (Active)   Amoxil  Estimated Onset Date:   Unspecified ; Reactions:   Rash ; Created By:   Shellie Vital; Reaction Status:   Active ; Category:   Drug ; Substance:   Amoxil ; Type:   Allergy ; Updated By:   Shellie Vital; Reviewed Date:   9/10/2018 11:52 AM CDT        Medication List   (As Of: 3/2/2020 6:34:05 PM CST)   Prescription/Discharge Order    hydrOXYzine  :   hydrOXYzine ; Status:   Prescribed ; Ordered As Mnemonic:   Vistaril 25 mg oral capsule ; Simple Display Line:   See Instructions, 1 -2 tabs daily if needed for panic, PRN: for anxiety, 30 EA, 0 Refill(s) ; Ordering Provider:   Neris Youssef; Catalog Code:   hydrOXYzine ; Order Dt/Tm:   11/11/2019 5:03:47 PM CST          venlafaxine  :   venlafaxine ; Status:   Prescribed ; Ordered As Mnemonic:   Effexor XR 37.5 mg oral capsule, extended release ; Simple Display Line:   See Instructions, 1 cap daily for 2 weeks then 2 caps daily, 45 EA, 1 Refill(s) ; Ordering Provider:   Neris Youssef;  Catalog Code:   venlafaxine ; Order Dt/Tm:   11/11/2019 5:02:41 PM CST

## 2022-02-16 NOTE — PROGRESS NOTES
Patient:   REGINALD MENESES            MRN: [id number]            FIN: [id number]               Age:   21 years     Sex:  Male     :  1995   Associated Diagnoses:   None   Author:   Jackson Nguyen MD      Visit Information      Date of Service: 2017 02:48 pm  Performing Location: [facility]  Encounter#: [id number]      Primary Care Provider (PCP):  Jackson Nguyen MD    NPI# [id number]      Referring Provider:  No referring provider recorded for selected visit.      Chief Complaint   3/21/2017 3:01 PM CDT    Pt would like to be tested for Candida. Pt thinks his anxiety is from candida.        History of Present Illness   CC as above and reviewed w  patient   He complains of depression and anxiety symtpoms for some time. No history of suicdie attempts or recent attempts at self-harm, does not believe he is atirsk for that now.   Prozac has resulted in some improvement but has noticed some visual symtpoms with this.      Review of Systems         Psych - negative for bipolar or psychitc symptoms      Health Status   Allergies:    Allergic Reactions (Selected)  Severity Not Documented  Amoxil (Rash)   Medications:  (Selected)   Prescriptions  Prescribed  sertraline 50 mg oral tablet: 1 tab(s) ( 50 mg ), po, daily, Instructions: After 1 week, increase two tablets daily., # 30 tab(s), 0 Refill(s), Type: Maintenance, Pharmacy: [facility], 1 tab(s) po daily,Instr:After 1 week, increase two tablets daily.  Documented Medications  Documented  PROzac: ( 20 mg ), po, daily, 0 Refill(s), Type: Maintenance   Problem list:    All Problems  Depression, major, recurrent, moderate / ICD-9-.32 / Confirmed      Histories   Past Medical History:    No active or resolved past medical history items have been selected or recorded.   Family History:    Asthma  Mother (Michelle)     Procedure history:    No active procedure history items have been selected or recorded.    Social History:        Tobacco Assessment: Denies Tobacco Use      Employment and Education Assessment            Student        Physical Examination   Vital Signs   3/21/2017 3:01 PM CDT Temperature Tympanic 98.3 DegF    Peripheral Pulse Rate 92 bpm    Systolic Blood Pressure 126 mmHg    Diastolic Blood Pressure 68 mmHg    Mean Arterial Pressure 87 mmHg      Measurements from flowsheet : Measurements   3/21/2017 3:01 PM CDT Height Measured - Standard 72 in    Weight Measured - Standard 189 lb    BSA 2.08 m2    Body Mass Index 25.63 kg/m2      Gen - apepars well  Psych - normal affect, good insight,       Review / Management   Results review:  Lab results   2/14/2017 3:13 PM CST Chlam/GC Comments See comment    Chlamydia RNA NOT DETECTED    GC RNA NOT DETECTED   2/14/2017 3:10 PM CST UA Color Yellow    UA Clarity Clear    UA pH 6.5    UA Specific Gravity 1.025    UA Glucose Negative mg/dL    UA Bilirubin Negative    UA Ketones Negative mg/dL    U Occult Blood Negative    UA Protein Negative mg/dL    UA Nitrite Negative    UA Leuk Est Negative    UA Urobilinogen Normal    UA WBC 0-2    UA RBC 0-2    UA Bacteria Few    UA Epithelial Cells Few    UA Mucous Present   .       Impression and Plan   Depression/anxiety  - SSRI has been effective but having some unpleasant side effects will switch to zoloft 50 mg daily increase to 100 mg daily after one week  - he will look into counseling  - discussed his concerns re - suzi that this is not really something accepted by mainstream medical community      Professional Services   Counseling Summary:  This was a 15 minute visit with greater than 50% of that time spent counseling the patient.

## 2022-02-16 NOTE — PROGRESS NOTES
Chief Complaint    Pt here to talk about his thyroid. Mother told him he should be tested. Pt states his anxiety has been better.  History of Present Illness      Overall feeling better and like meds are helping with anxiety.  However he notes persistent fatigue.  Mom would like him to get his thyroid tested.      He reports feeling fatigued and sleepy all the time.  He notes this is been going on for a few years.  He is here with his girlfriend.  He reports nonrestorative sleep, that he wakes up feeling unrefreshed.  His girlfriend chimes in and he is a chronic loud snorer, that there have been witnessed episodes of apnea lasting about 5 seconds, that he gasps after these episodes,  Review of Systems      Denies morning headache.  Physical Exam   Vitals & Measurements    T: 97.8(Tympanic)  HR: 84(Peripheral)  BP: 114/78     HT: 72 in  WT: 196 lb  BMI: 26.58       Overall he appears well oropharynx no signs of infection or inflammation but the airway does appear slightly narrowed.  Assessment/Plan   Anxiety: Doing well on sertraline and Wellbutrin will continue these.   Fatigue: We will have him get his thyroid checked.  We will get him set up for sleep study.  Problem List/Past Medical History    Ongoing     Depression, major, recurrent, moderate     Seasonal allergies    Historical  Medications    sertraline 50 mg oral tablet, 50 mg= 1 tab(s), po, daily, 3 refills    Wellbutrin  mg/24 hours oral tablet, extended release, 150 mg= 1 tab(s), po, q 24 hrs, 2 refills  Allergies    Amoxil (Rash)  Social History    Smoking Status - 08/18/2017     Current every day smoker     Alcohol - 04/21/2017      Never     Employment and Education - 04/21/2017      Employed, Work/School description: housekeeping.     Exercise and Physical Activity - Regular exercise, 04/21/2017     Substance Abuse - Denies Substance Abuse, 04/21/2017     Tobacco - Current, 04/21/2017      Smoke and chew., 5 year(s).  Family History    Asthma:  Mother.  Immunizations      Vaccine Date Status      tetanus/diphth/pertuss (Tdap) adult/adol 02/22/2015 Recorded      varicella 07/07/2008 Recorded      meningococcal conjugate vaccine 07/07/2008 Recorded      tetanus/diphth/pertuss (Tdap) adult/adol 07/07/2008 Recorded      varicella 12/12/1997 Recorded      DTaP 12/12/1997 Recorded      Hib (PRP-T) 12/12/1997 Recorded      MMR (measles/mumps/rubella) 12/12/1997 Recorded      DTaP 01/14/1997 Recorded      Hep B 01/14/1997 Recorded      Hib (PRP-T) 01/14/1997 Recorded      IPV 01/14/1997 Recorded      DTaP 1996 Recorded      Hib (PRP-T) 1996 Recorded      IPV 1996 Recorded      DTaP 1996 Recorded      Hep B 1996 Recorded      Hib (PRP-T) 1996 Recorded      IPV 1996 Recorded      Hep B 1996 Recorded  Lab Results      Results (Last 90 days)      No results located.

## 2022-02-16 NOTE — PROGRESS NOTES
Patient:   REGINALD MENESES            MRN: [id number]            FIN: [id number]               Age:   21 years     Sex:  Male     :  1995   Associated Diagnoses:   None   Author:   Jackson Nguyen MD      Visit Information      Date of Service: 2017 08:21 am  Performing Location: [facility]  Encounter#: [id number]      Primary Care Provider (PCP):  Jackson Nguyen MD    NPI# [id number]      Referring Provider:  No referring provider recorded for selected visit.      Chief Complaint   2017 8:25 AM CDT    Pt c/o heart palpitations and arm tingling on left side for 1 week.        History of Present Illness   CC as above and reviewed w  patient . Pt states maday episodes occur about 4 times per day and last between 1 and 5 minutes. It feels like his heart is skipping beats. He has a tingly pain in his left chest that radiates down his left arm. It feels hard to breathe and he feels lightheaded. Pt acknowledges he's had palpitations in the past but not this frequently. There's no triggering factors such as exertion. He's worried it may be related to sertraline, which he started about 4 weeks ago @ 50 mg, and then increased to 100 mg 2 weeks ago.     In terms of anxiety and depression, pt feels these are much improved on sertraline. He feels as though anxiety isn't likely to be causing this because it doesn't feel like a panic attack.      Review of Systems         GI - no blood in stool  Psych - no suicidal ideation      Health Status   Allergies:    Allergic Reactions (Selected)  Severity Not Documented  Amoxil (Rash)   Medications:  (Selected)   Prescriptions  Prescribed  sertraline 50 mg oral tablet: 2 tab(s) ( 100 mg ), po, daily, # 28 tab(s), 0 Refill(s), Type: Maintenance, Pharmacy: [facility]   Problem list:    All Problems  Depression, major, recurrent, moderate / ICD-9-.32 / Confirmed  Seasonal allergies / SNOMED CT [id number] / Confirmed       Histories   Past Medical History:    Active  Seasonal allergies ([id number])   Family History:    Asthma  Mother (Michelle)     Procedure history:    No active procedure history items have been selected or recorded.   Social History:        Tobacco Assessment: Current            Smoke and chew., 5 year(s).                     Comments:                      03/23/2017 - Carol Marsh                     Amount per day:  Frequently.      Substance Abuse Assessment: Denies Substance Abuse      Employment and Education Assessment            Employed, Work/School description: housekeeping.      Exercise and Physical Activity Assessment: Regular exercise        Physical Examination   Vital Signs   4/21/2017 8:25 AM CDT Temperature Tympanic 97.3 DegF  LOW    Peripheral Pulse Rate 79 bpm    Systolic Blood Pressure 129 mmHg    Diastolic Blood Pressure 80 mmHg    Mean Arterial Pressure 96 mmHg      Measurements from flowsheet : Measurements   4/21/2017 8:25 AM CDT Height Measured - Standard 72 in    Weight Measured - Standard 192 lb    BSA 2.1 m2    Body Mass Index 26.04 kg/m2      Gen - appears well  Psych - calm, cooperative, appropriate  CV: RRR w/o MRG. Normal S1 and S2   Resp: Clear to auscultation b/l. Normal breath sounds without wheezes or crackles. No increased work of breathing.   Eyes - no pallor  Neck - no JVP elevation  Mouth - MMM      Review / Management   Results review:  Lab results   2/14/2017 3:13 PM CST Chlam/GC Comments See comment    Chlamydia RNA NOT DETECTED    GC RNA NOT DETECTED   2/14/2017 3:10 PM CST UA Color Yellow    UA Clarity Clear    UA pH 6.5    UA Specific Gravity 1.025    UA Glucose Negative mg/dL    UA Bilirubin Negative    UA Ketones Negative mg/dL    U Occult Blood Negative    UA Protein Negative mg/dL    UA Nitrite Negative    UA Leuk Est Negative    UA Urobilinogen Normal    UA Epithelial Cells Few    UA Mucous Present    UA WBC 0-2    UA RBC 0-2    UA Bacteria Few   .    EKG normal.  Normal Qtc.      Impression and Plan   Palpitations  - he will try decreasing zoloft to 50 mg. He will call me in 1 week and if palpitations persist we will consider stopping medication, doing further workup such as heart monitor, thyroid/anemia testing, etc.    Depression/anxiety  - much improved on zoloft.